# Patient Record
Sex: FEMALE | Race: WHITE | NOT HISPANIC OR LATINO | Employment: UNEMPLOYED | URBAN - METROPOLITAN AREA
[De-identification: names, ages, dates, MRNs, and addresses within clinical notes are randomized per-mention and may not be internally consistent; named-entity substitution may affect disease eponyms.]

---

## 2023-09-08 ENCOUNTER — LAB (OUTPATIENT)
Dept: LAB | Facility: LAB | Age: 73
End: 2023-09-08
Payer: MEDICARE

## 2023-09-08 DIAGNOSIS — Z00.00 HEALTHCARE MAINTENANCE: ICD-10-CM

## 2023-09-08 LAB
ALANINE AMINOTRANSFERASE (SGPT) (U/L) IN SER/PLAS: 17 U/L (ref 7–45)
ALBUMIN (G/DL) IN SER/PLAS: 4.1 G/DL (ref 3.4–5)
ALKALINE PHOSPHATASE (U/L) IN SER/PLAS: 51 U/L (ref 33–136)
ANION GAP IN SER/PLAS: 15 MMOL/L (ref 10–20)
APPEARANCE, URINE: CLEAR
ASPARTATE AMINOTRANSFERASE (SGOT) (U/L) IN SER/PLAS: 16 U/L (ref 9–39)
BASOPHILS (10*3/UL) IN BLOOD BY AUTOMATED COUNT: 0.01 X10E9/L (ref 0–0.1)
BASOPHILS/100 LEUKOCYTES IN BLOOD BY AUTOMATED COUNT: 0.3 % (ref 0–2)
BILIRUBIN TOTAL (MG/DL) IN SER/PLAS: 0.7 MG/DL (ref 0–1.2)
BILIRUBIN, URINE: NEGATIVE
BLOOD, URINE: NEGATIVE
CALCIDIOL (25 OH VITAMIN D3) (NG/ML) IN SER/PLAS: 35 NG/ML
CALCIUM (MG/DL) IN SER/PLAS: 9.3 MG/DL (ref 8.6–10.6)
CARBON DIOXIDE, TOTAL (MMOL/L) IN SER/PLAS: 27 MMOL/L (ref 21–32)
CHLORIDE (MMOL/L) IN SER/PLAS: 104 MMOL/L (ref 98–107)
CHOLESTEROL (MG/DL) IN SER/PLAS: 198 MG/DL (ref 0–199)
CHOLESTEROL IN HDL (MG/DL) IN SER/PLAS: 56.2 MG/DL
CHOLESTEROL/HDL RATIO: 3.5
COLOR, URINE: YELLOW
CREATININE (MG/DL) IN SER/PLAS: 0.77 MG/DL (ref 0.5–1.05)
EOSINOPHILS (10*3/UL) IN BLOOD BY AUTOMATED COUNT: 0.05 X10E9/L (ref 0–0.4)
EOSINOPHILS/100 LEUKOCYTES IN BLOOD BY AUTOMATED COUNT: 1.3 % (ref 0–6)
ERYTHROCYTE DISTRIBUTION WIDTH (RATIO) BY AUTOMATED COUNT: 11.8 % (ref 11.5–14.5)
ERYTHROCYTE MEAN CORPUSCULAR HEMOGLOBIN CONCENTRATION (G/DL) BY AUTOMATED: 32.7 G/DL (ref 32–36)
ERYTHROCYTE MEAN CORPUSCULAR VOLUME (FL) BY AUTOMATED COUNT: 98 FL (ref 80–100)
ERYTHROCYTES (10*6/UL) IN BLOOD BY AUTOMATED COUNT: 4.36 X10E12/L (ref 4–5.2)
GFR FEMALE: 81 ML/MIN/1.73M2
GLUCOSE (MG/DL) IN SER/PLAS: 90 MG/DL (ref 74–99)
GLUCOSE, URINE: NEGATIVE MG/DL
HEMATOCRIT (%) IN BLOOD BY AUTOMATED COUNT: 42.8 % (ref 36–46)
HEMOGLOBIN (G/DL) IN BLOOD: 14 G/DL (ref 12–16)
IMMATURE GRANULOCYTES/100 LEUKOCYTES IN BLOOD BY AUTOMATED COUNT: 0.3 % (ref 0–0.9)
KETONES, URINE: NEGATIVE MG/DL
LDL: 115 MG/DL (ref 0–99)
LEUKOCYTE ESTERASE, URINE: ABNORMAL
LEUKOCYTES (10*3/UL) IN BLOOD BY AUTOMATED COUNT: 3.8 X10E9/L (ref 4.4–11.3)
LYMPHOCYTES (10*3/UL) IN BLOOD BY AUTOMATED COUNT: 1.14 X10E9/L (ref 0.8–3)
LYMPHOCYTES/100 LEUKOCYTES IN BLOOD BY AUTOMATED COUNT: 29.8 % (ref 13–44)
MONOCYTES (10*3/UL) IN BLOOD BY AUTOMATED COUNT: 0.31 X10E9/L (ref 0.05–0.8)
MONOCYTES/100 LEUKOCYTES IN BLOOD BY AUTOMATED COUNT: 8.1 % (ref 2–10)
NEUTROPHILS (10*3/UL) IN BLOOD BY AUTOMATED COUNT: 2.31 X10E9/L (ref 1.6–5.5)
NEUTROPHILS/100 LEUKOCYTES IN BLOOD BY AUTOMATED COUNT: 60.2 % (ref 40–80)
NITRITE, URINE: NEGATIVE
NRBC (PER 100 WBCS) BY AUTOMATED COUNT: 0 /100 WBC (ref 0–0)
PH, URINE: 6 (ref 5–8)
PLATELETS (10*3/UL) IN BLOOD AUTOMATED COUNT: 189 X10E9/L (ref 150–450)
POTASSIUM (MMOL/L) IN SER/PLAS: 4.6 MMOL/L (ref 3.5–5.3)
PROTEIN TOTAL: 6.3 G/DL (ref 6.4–8.2)
PROTEIN, URINE: NEGATIVE MG/DL
RBC, URINE: 1 /HPF (ref 0–5)
SODIUM (MMOL/L) IN SER/PLAS: 141 MMOL/L (ref 136–145)
SPECIFIC GRAVITY, URINE: 1.01 (ref 1–1.03)
SQUAMOUS EPITHELIAL CELLS, URINE: <1 /HPF
THYROTROPIN (MIU/L) IN SER/PLAS BY DETECTION LIMIT <= 0.05 MIU/L: 1.78 MIU/L (ref 0.44–3.98)
TRIGLYCERIDE (MG/DL) IN SER/PLAS: 136 MG/DL (ref 0–149)
UREA NITROGEN (MG/DL) IN SER/PLAS: 16 MG/DL (ref 6–23)
UROBILINOGEN, URINE: <2 MG/DL (ref 0–1.9)
VLDL: 27 MG/DL (ref 0–40)
WBC, URINE: 3 /HPF (ref 0–5)

## 2023-09-08 PROCEDURE — 85025 COMPLETE CBC W/AUTO DIFF WBC: CPT

## 2023-09-08 PROCEDURE — 81001 URINALYSIS AUTO W/SCOPE: CPT

## 2023-09-08 PROCEDURE — 84443 ASSAY THYROID STIM HORMONE: CPT

## 2023-09-08 PROCEDURE — 36415 COLL VENOUS BLD VENIPUNCTURE: CPT

## 2023-09-08 PROCEDURE — 80053 COMPREHEN METABOLIC PANEL: CPT

## 2023-09-08 PROCEDURE — 80061 LIPID PANEL: CPT

## 2023-09-08 PROCEDURE — 82306 VITAMIN D 25 HYDROXY: CPT

## 2023-09-12 ENCOUNTER — OFFICE VISIT (OUTPATIENT)
Dept: PRIMARY CARE | Facility: CLINIC | Age: 73
End: 2023-09-12
Payer: MEDICARE

## 2023-09-12 VITALS
WEIGHT: 159.8 LBS | HEIGHT: 65 IN | OXYGEN SATURATION: 99 % | RESPIRATION RATE: 18 BRPM | BODY MASS INDEX: 26.62 KG/M2 | HEART RATE: 68 BPM

## 2023-09-12 DIAGNOSIS — F41.9 ANXIETY: ICD-10-CM

## 2023-09-12 DIAGNOSIS — Z00.00 HEALTHCARE MAINTENANCE: Primary | ICD-10-CM

## 2023-09-12 DIAGNOSIS — M81.0 OSTEOPOROSIS, UNSPECIFIED OSTEOPOROSIS TYPE, UNSPECIFIED PATHOLOGICAL FRACTURE PRESENCE: ICD-10-CM

## 2023-09-12 PROBLEM — H93.13 BILATERAL TINNITUS: Status: ACTIVE | Noted: 2023-09-12

## 2023-09-12 PROBLEM — H00.019 STYE: Status: ACTIVE | Noted: 2023-09-12

## 2023-09-12 PROBLEM — H93.19 TINNITUS: Status: ACTIVE | Noted: 2023-09-12

## 2023-09-12 PROBLEM — N89.8 VAGINAL ITCHING: Status: ACTIVE | Noted: 2023-09-12

## 2023-09-12 PROBLEM — E78.1 HIGH TRIGLYCERIDES: Status: ACTIVE | Noted: 2023-09-12

## 2023-09-12 PROBLEM — R09.A0 SENSATION OF FOREIGN BODY: Status: ACTIVE | Noted: 2023-09-12

## 2023-09-12 PROBLEM — N76.0 VAGINITIS: Status: ACTIVE | Noted: 2023-09-12

## 2023-09-12 PROBLEM — R05.3 CHRONIC COUGH: Status: ACTIVE | Noted: 2023-09-12

## 2023-09-12 PROBLEM — J34.3 NASAL TURBINATE HYPERTROPHY: Status: ACTIVE | Noted: 2023-09-12

## 2023-09-12 PROBLEM — L90.0 LICHEN SCLEROSUS: Status: ACTIVE | Noted: 2023-09-12

## 2023-09-12 PROBLEM — M85.80 OSTEOPENIA: Status: ACTIVE | Noted: 2023-09-12

## 2023-09-12 PROBLEM — M67.431 GANGLION CYST OF WRIST, RIGHT: Status: ACTIVE | Noted: 2023-09-12

## 2023-09-12 PROBLEM — R92.8 ABNORMAL MAMMOGRAM: Status: ACTIVE | Noted: 2023-09-12

## 2023-09-12 PROBLEM — N95.2 ATROPHY OF VAGINA: Status: ACTIVE | Noted: 2023-09-12

## 2023-09-12 PROBLEM — J32.9 SINUSITIS: Status: ACTIVE | Noted: 2023-09-12

## 2023-09-12 PROBLEM — L03.019 PARONYCHIA, FINGER: Status: ACTIVE | Noted: 2023-09-12

## 2023-09-12 PROBLEM — R35.0 URINARY FREQUENCY: Status: ACTIVE | Noted: 2023-09-12

## 2023-09-12 PROBLEM — S61.219A FINGER LACERATION: Status: ACTIVE | Noted: 2023-09-12

## 2023-09-12 PROCEDURE — 1170F FXNL STATUS ASSESSED: CPT | Performed by: INTERNAL MEDICINE

## 2023-09-12 PROCEDURE — 1160F RVW MEDS BY RX/DR IN RCRD: CPT | Performed by: INTERNAL MEDICINE

## 2023-09-12 PROCEDURE — G0439 PPPS, SUBSEQ VISIT: HCPCS | Performed by: INTERNAL MEDICINE

## 2023-09-12 PROCEDURE — 1159F MED LIST DOCD IN RCRD: CPT | Performed by: INTERNAL MEDICINE

## 2023-09-12 PROCEDURE — 1036F TOBACCO NON-USER: CPT | Performed by: INTERNAL MEDICINE

## 2023-09-12 PROCEDURE — 1126F AMNT PAIN NOTED NONE PRSNT: CPT | Performed by: INTERNAL MEDICINE

## 2023-09-12 RX ORDER — VALACYCLOVIR HYDROCHLORIDE 500 MG/1
TABLET, FILM COATED ORAL
COMMUNITY
Start: 2023-01-04

## 2023-09-12 RX ORDER — ESCITALOPRAM OXALATE 10 MG/1
TABLET ORAL
COMMUNITY
Start: 2011-02-10 | End: 2023-09-12 | Stop reason: ALTCHOICE

## 2023-09-12 RX ORDER — CITALOPRAM 20 MG/1
20 TABLET, FILM COATED ORAL DAILY
COMMUNITY
End: 2023-09-12 | Stop reason: SDUPTHER

## 2023-09-12 RX ORDER — ALENDRONATE SODIUM 70 MG/1
70 TABLET ORAL
COMMUNITY
Start: 2023-08-01 | End: 2024-04-09

## 2023-09-12 RX ORDER — ACETAMINOPHEN 500 MG
TABLET ORAL
COMMUNITY

## 2023-09-12 RX ORDER — MULTIVIT-MIN/FA/LYCOPEN/LUTEIN .4-300-25
TABLET ORAL
COMMUNITY

## 2023-09-12 RX ORDER — CITALOPRAM 20 MG/1
20 TABLET, FILM COATED ORAL DAILY
Qty: 90 TABLET | Refills: 3 | Status: SHIPPED | OUTPATIENT
Start: 2023-09-12 | End: 2024-09-11

## 2023-09-12 ASSESSMENT — ACTIVITIES OF DAILY LIVING (ADL)
TAKING_MEDICATION: INDEPENDENT
DRESSING: INDEPENDENT
BATHING: INDEPENDENT
MANAGING_FINANCES: INDEPENDENT
GROCERY_SHOPPING: INDEPENDENT
DOING_HOUSEWORK: INDEPENDENT

## 2023-09-12 ASSESSMENT — PATIENT HEALTH QUESTIONNAIRE - PHQ9
1. LITTLE INTEREST OR PLEASURE IN DOING THINGS: NOT AT ALL
SUM OF ALL RESPONSES TO PHQ9 QUESTIONS 1 AND 2: 0
2. FEELING DOWN, DEPRESSED OR HOPELESS: NOT AT ALL
1. LITTLE INTEREST OR PLEASURE IN DOING THINGS: NOT AT ALL
SUM OF ALL RESPONSES TO PHQ9 QUESTIONS 1 AND 2: 0
2. FEELING DOWN, DEPRESSED OR HOPELESS: NOT AT ALL

## 2023-09-12 ASSESSMENT — ENCOUNTER SYMPTOMS
DEPRESSION: 0
OCCASIONAL FEELINGS OF UNSTEADINESS: 0
LOSS OF SENSATION IN FEET: 0

## 2023-09-12 NOTE — PROGRESS NOTES
"Subjective   Reason for Visit: Kelly Moreland is an 73 y.o. female here for a Medicare Wellness visit.     73 F    Osteoporosis    OA    ATS         Reviewed all medications by prescribing practitioner or clinical pharmacist (such as prescriptions, OTCs, herbal therapies and supplements) and documented in the medical record.    HPI    Patient Care Team:  Reece Esparza MD as PCP - General  Hoang Garza MD as PCP - [employer]age PCP     Review of Systems      No Fever/chills/headaches/dizziness/chest pains/ shortness of breath/palpitations/Nausea/vomiting/diarrhea/ constipation/urine frequency/blood in urine.      Objective   Vitals:  Pulse 68   Resp 18   Ht 1.644 m (5' 4.72\")   Wt 72.5 kg (159 lb 12.8 oz)   SpO2 99%   BMI 26.82 kg/m²       Physical Exam    No JVP elevation. No palpable Lymph Nodes. No Thyromegaly    CVS-NL S1/S2 . No MRG    Lungs-CTA. B/S= B/L    Abdomen-Soft, Non-tender. No masses or HSM    Extremities: No C/C/E      Assessment/Plan   Problem List Items Addressed This Visit    None  Visit Diagnoses       Healthcare maintenance        Relevant Orders    TSH with reflex to Free T4 if abnormal (Completed)    Lipid Panel (Completed)    Comprehensive Metabolic Panel (Completed)    CBC and Auto Differential (Completed)    Urinalysis with Reflex Microscopic (Completed)    Vitamin D 25-Hydroxy,Total (for eval of Vitamin D levels) (Completed)        73 F    Osteoporosis-Continue with Fosamax 70 mg Q week. Follow up with Rheumatology    OA    ATS-Continue with Citalopram 20 mg daily    CRC-    Mammogram/Gyn-Constantin Kim    Continue with current Rx    Follow up/ Call with any concerns    Follow up in 12 months /PRN             "

## 2023-11-15 ENCOUNTER — OFFICE VISIT (OUTPATIENT)
Dept: OBSTETRICS AND GYNECOLOGY | Facility: CLINIC | Age: 73
End: 2023-11-15
Payer: MEDICARE

## 2023-11-15 VITALS
WEIGHT: 155 LBS | HEIGHT: 65 IN | DIASTOLIC BLOOD PRESSURE: 76 MMHG | SYSTOLIC BLOOD PRESSURE: 124 MMHG | BODY MASS INDEX: 25.83 KG/M2

## 2023-11-15 DIAGNOSIS — Z01.419 WELL WOMAN EXAM WITH ROUTINE GYNECOLOGICAL EXAM: Primary | ICD-10-CM

## 2023-11-15 DIAGNOSIS — L90.0 LICHEN SCLEROSUS: ICD-10-CM

## 2023-11-15 DIAGNOSIS — Z12.31 BREAST CANCER SCREENING BY MAMMOGRAM: ICD-10-CM

## 2023-11-15 PROCEDURE — 1160F RVW MEDS BY RX/DR IN RCRD: CPT | Performed by: NURSE PRACTITIONER

## 2023-11-15 PROCEDURE — 88305 TISSUE EXAM BY PATHOLOGIST: CPT | Performed by: DERMATOLOGY

## 2023-11-15 PROCEDURE — 1036F TOBACCO NON-USER: CPT | Performed by: NURSE PRACTITIONER

## 2023-11-15 PROCEDURE — 1159F MED LIST DOCD IN RCRD: CPT | Performed by: NURSE PRACTITIONER

## 2023-11-15 PROCEDURE — 88305 TISSUE EXAM BY PATHOLOGIST: CPT

## 2023-11-15 PROCEDURE — 99397 PER PM REEVAL EST PAT 65+ YR: CPT | Performed by: NURSE PRACTITIONER

## 2023-11-15 PROCEDURE — 1126F AMNT PAIN NOTED NONE PRSNT: CPT | Performed by: NURSE PRACTITIONER

## 2023-11-15 RX ORDER — CLOBETASOL PROPIONATE 0.5 MG/G
OINTMENT TOPICAL 2 TIMES DAILY
Qty: 45 G | Refills: 0 | Status: SHIPPED | OUTPATIENT
Start: 2023-11-15

## 2023-11-15 ASSESSMENT — PAIN SCALES - GENERAL: PAINLEVEL: 0-NO PAIN

## 2023-11-15 ASSESSMENT — ENCOUNTER SYMPTOMS
ENDOCRINE NEGATIVE: 0
EYES NEGATIVE: 0
MUSCULOSKELETAL NEGATIVE: 0
ALLERGIC/IMMUNOLOGIC NEGATIVE: 0
GASTROINTESTINAL NEGATIVE: 0
CONSTITUTIONAL NEGATIVE: 0
PSYCHIATRIC NEGATIVE: 0
CARDIOVASCULAR NEGATIVE: 0
HEMATOLOGIC/LYMPHATIC NEGATIVE: 0
NEUROLOGICAL NEGATIVE: 0
RESPIRATORY NEGATIVE: 0

## 2023-11-15 NOTE — PROGRESS NOTES
Subjective   Patient ID: Kelly Moreland is a 73 y.o. female who presents for Annual Exam (Last pap 12/22/2014 /Last emiliana 12/1/2022/Last col unknown/Last dex 9/26/2022).  HPI  had a consult with rheumatology, diagnosed with osteoporosis; was on Raloxifene but was now changed to fosamax  i previously suspected LS based off of exam; pt had stopped using clobetasol and only has occasional dryness, no biopsy    Occasional pruritus near rectum, has not treated     Age at Menopause: 49  History of HT: for a few years initially   History of bioidentical/otc: none  Postmenopausal bleeding: n/a  Mood changes: occasional seasonal affective depression, on citalopram  Sleep problems: none  VMS: none  GSM: none currently  Urinary incontinence: nocturia followed by leaking, no incontinence during the day  Vaginal hygiene: soap-dove  BMD: osteoporosis, managed by rheumatology, fosamax       H/O of STI: none  requesting sti testing?: no  H/O abnormal pap: none     are you sexually active?: no by choice        Lives with: by herself  Employment: retired teacher  exercise: walks  calcium intake: adequate     FH of breast cancer: maternal grandmother  FH of ovarian cancer: none  FH of colon cancer: none        Review of Systems    Objective   Physical Exam  Vitals and nursing note reviewed.   Constitutional:       Appearance: Normal appearance.   Neck:      Thyroid: No thyroid mass.   Cardiovascular:      Rate and Rhythm: Normal rate and regular rhythm.      Heart sounds: Normal heart sounds.   Pulmonary:      Effort: Pulmonary effort is normal.      Breath sounds: Normal breath sounds.   Chest:   Breasts:     Right: Normal.      Left: Normal.   Abdominal:      Tenderness: There is no abdominal tenderness.   Genitourinary:     General: Normal vulva.      Exam position: Lithotomy position.      Labia:         Right: No lesion.         Left: No lesion.       Vagina: Normal.      Cervix: Normal.          Comments: Whitening in the above  marked area, consistent with LS, no lesions  Skin:     General: Skin is warm and dry.   Neurological:      Mental Status: She is alert.   Psychiatric:         Attention and Perception: Attention normal.         Mood and Affect: Mood normal.         Speech: Speech normal.         Behavior: Behavior normal.         Thought Content: Thought content normal.         Cognition and Memory: Cognition and memory normal.         Judgment: Judgment normal.     Patient ID: Kelly Moreland is a 73 y.o. female.    Biopsy vulva    Date/Time: 11/15/2023 9:39 AM    Performed by: RAHUL Bruce  Authorized by: RAHUL Bruce    Consent:     Consent obtained:  Written    Consent given by:  Patient    Risks, benefits, and alternatives were discussed: yes      Risks discussed:  Bleeding, infection and pain    Alternatives discussed:  No treatment and observation  Universal protocol:     Procedure explained and questions answered to patient or proxy's satisfaction: yes      Site/side marked: yes      Immediately prior to procedure, a time out was called: yes      Patient identity confirmed:  Verbally with patient      Assessment/Plan   Diagnoses and all orders for this visit:  Well woman exam with routine gynecological exam  Breast cancer screening by mammogram  -     BI mammo bilateral screening tomosynthesis; Future  Lichen sclerosus  -     Biopsy vulva  -     Dermatopathology-[facility]    Clobetasol prescribed; will call with result

## 2023-11-16 ENCOUNTER — APPOINTMENT (OUTPATIENT)
Dept: RADIOLOGY | Facility: CLINIC | Age: 73
End: 2023-11-16
Payer: MEDICARE

## 2023-11-17 LAB
LABORATORY COMMENT REPORT: NORMAL
PATH REPORT.FINAL DX SPEC: NORMAL
PATH REPORT.GROSS SPEC: NORMAL
PATH REPORT.RELEVANT HX SPEC: NORMAL
PATH REPORT.TOTAL CANCER: NORMAL

## 2023-11-28 ENCOUNTER — APPOINTMENT (OUTPATIENT)
Dept: OBSTETRICS AND GYNECOLOGY | Facility: CLINIC | Age: 73
End: 2023-11-28
Payer: MEDICARE

## 2023-12-04 ENCOUNTER — ANCILLARY PROCEDURE (OUTPATIENT)
Dept: RADIOLOGY | Facility: CLINIC | Age: 73
End: 2023-12-04
Payer: MEDICARE

## 2023-12-04 VITALS — WEIGHT: 154.98 LBS | HEIGHT: 65 IN | BODY MASS INDEX: 25.82 KG/M2

## 2023-12-04 DIAGNOSIS — Z12.31 BREAST CANCER SCREENING BY MAMMOGRAM: ICD-10-CM

## 2023-12-04 PROCEDURE — 77067 SCR MAMMO BI INCL CAD: CPT | Performed by: RADIOLOGY

## 2023-12-04 PROCEDURE — 77063 BREAST TOMOSYNTHESIS BI: CPT | Performed by: RADIOLOGY

## 2023-12-04 PROCEDURE — 77067 SCR MAMMO BI INCL CAD: CPT

## 2023-12-19 ENCOUNTER — OFFICE VISIT (OUTPATIENT)
Dept: OBSTETRICS AND GYNECOLOGY | Facility: CLINIC | Age: 73
End: 2023-12-19
Payer: MEDICARE

## 2023-12-19 DIAGNOSIS — L28.0 LICHEN SIMPLEX CHRONICUS: Primary | ICD-10-CM

## 2023-12-19 PROCEDURE — 1159F MED LIST DOCD IN RCRD: CPT | Performed by: NURSE PRACTITIONER

## 2023-12-19 PROCEDURE — 1036F TOBACCO NON-USER: CPT | Performed by: NURSE PRACTITIONER

## 2023-12-19 PROCEDURE — 99213 OFFICE O/P EST LOW 20 MIN: CPT | Performed by: NURSE PRACTITIONER

## 2023-12-19 PROCEDURE — 1160F RVW MEDS BY RX/DR IN RCRD: CPT | Performed by: NURSE PRACTITIONER

## 2023-12-19 PROCEDURE — 1126F AMNT PAIN NOTED NONE PRSNT: CPT | Performed by: NURSE PRACTITIONER

## 2023-12-19 ASSESSMENT — ENCOUNTER SYMPTOMS
CARDIOVASCULAR NEGATIVE: 0
MUSCULOSKELETAL NEGATIVE: 0
PSYCHIATRIC NEGATIVE: 0
CONSTITUTIONAL NEGATIVE: 0
EYES NEGATIVE: 0
NEUROLOGICAL NEGATIVE: 0
RESPIRATORY NEGATIVE: 0
ENDOCRINE NEGATIVE: 0
HEMATOLOGIC/LYMPHATIC NEGATIVE: 0
GASTROINTESTINAL NEGATIVE: 0
ALLERGIC/IMMUNOLOGIC NEGATIVE: 0

## 2023-12-19 NOTE — PROGRESS NOTES
Subjective   Patient ID: Kelly Moreland is a 73 y.o. female who presents for No chief complaint on file..  HPI  Vulvar biopsy 11/2023; LSC, not LS  Clobetasol BID prescribed    Today she states she is feeling much better; no longer having pruritus      Review of Systems    Objective   Physical Exam  Genitourinary:         Comments: No longer having any whitening; erythema in the above marked areas, no lesions        Assessment/Plan   Diagnoses and all orders for this visit:  Lichen simplex chronicus       Pt asked to cut down on the clobetasol from BID to daily  Pt asked to contact me in 2-3 weeks with an update; will continue to wean down    RAHUL Bruce 12/19/23 4:04 PM

## 2024-04-09 DIAGNOSIS — M81.0 AGE-RELATED OSTEOPOROSIS WITHOUT CURRENT PATHOLOGICAL FRACTURE: ICD-10-CM

## 2024-04-09 RX ORDER — ALENDRONATE SODIUM 70 MG/1
70 TABLET ORAL
Qty: 12 TABLET | Refills: 0 | Status: SHIPPED | OUTPATIENT
Start: 2024-04-09

## 2024-07-02 DIAGNOSIS — M81.0 AGE-RELATED OSTEOPOROSIS WITHOUT CURRENT PATHOLOGICAL FRACTURE: ICD-10-CM

## 2024-07-02 RX ORDER — ALENDRONATE SODIUM 70 MG/1
70 TABLET ORAL
Qty: 4 TABLET | Refills: 0 | Status: SHIPPED | OUTPATIENT
Start: 2024-07-02

## 2024-08-01 DIAGNOSIS — M81.0 AGE-RELATED OSTEOPOROSIS WITHOUT CURRENT PATHOLOGICAL FRACTURE: ICD-10-CM

## 2024-08-01 RX ORDER — ALENDRONATE SODIUM 70 MG/1
70 TABLET ORAL
Qty: 4 TABLET | Refills: 0 | Status: SHIPPED | OUTPATIENT
Start: 2024-08-04

## 2024-08-12 ENCOUNTER — TELEPHONE (OUTPATIENT)
Dept: PRIMARY CARE | Facility: CLINIC | Age: 74
End: 2024-08-12
Payer: MEDICARE

## 2024-08-12 NOTE — TELEPHONE ENCOUNTER
Patient stated she was seen by you in October 2023 but, she does not remember when she should follow up.  When do you want to see her again?

## 2024-08-28 DIAGNOSIS — M81.0 AGE-RELATED OSTEOPOROSIS WITHOUT CURRENT PATHOLOGICAL FRACTURE: ICD-10-CM

## 2024-08-28 RX ORDER — ALENDRONATE SODIUM 70 MG/1
70 TABLET ORAL
Qty: 12 TABLET | Refills: 0 | Status: SHIPPED | OUTPATIENT
Start: 2024-09-01

## 2024-09-18 ENCOUNTER — APPOINTMENT (OUTPATIENT)
Dept: PRIMARY CARE | Facility: CLINIC | Age: 74
End: 2024-09-18
Payer: MEDICARE

## 2024-09-25 ENCOUNTER — LAB (OUTPATIENT)
Dept: LAB | Facility: LAB | Age: 74
End: 2024-09-25
Payer: MEDICARE

## 2024-09-25 ENCOUNTER — APPOINTMENT (OUTPATIENT)
Dept: PRIMARY CARE | Facility: CLINIC | Age: 74
End: 2024-09-25
Payer: MEDICARE

## 2024-09-25 VITALS
HEART RATE: 77 BPM | HEIGHT: 65 IN | DIASTOLIC BLOOD PRESSURE: 79 MMHG | BODY MASS INDEX: 28.32 KG/M2 | SYSTOLIC BLOOD PRESSURE: 121 MMHG | WEIGHT: 170 LBS

## 2024-09-25 DIAGNOSIS — M81.0 POST-MENOPAUSAL OSTEOPOROSIS: ICD-10-CM

## 2024-09-25 DIAGNOSIS — E78.1 HIGH TRIGLYCERIDES: ICD-10-CM

## 2024-09-25 DIAGNOSIS — Z00.00 MEDICARE ANNUAL WELLNESS VISIT, SUBSEQUENT: ICD-10-CM

## 2024-09-25 DIAGNOSIS — F41.9 ANXIETY: ICD-10-CM

## 2024-09-25 DIAGNOSIS — R79.9 ABNORMAL FINDING OF BLOOD CHEMISTRY, UNSPECIFIED: ICD-10-CM

## 2024-09-25 DIAGNOSIS — Z23 NEED FOR PNEUMOCOCCAL 20-VALENT CONJUGATE VACCINATION: ICD-10-CM

## 2024-09-25 DIAGNOSIS — Z12.31 SCREENING MAMMOGRAM FOR BREAST CANCER: ICD-10-CM

## 2024-09-25 DIAGNOSIS — Z00.00 MEDICARE ANNUAL WELLNESS VISIT, SUBSEQUENT: Primary | ICD-10-CM

## 2024-09-25 PROBLEM — Z86.14 HISTORY OF METHICILLIN RESISTANT STAPHYLOCOCCUS AUREUS INFECTION: Status: RESOLVED | Noted: 2024-09-25 | Resolved: 2024-09-25

## 2024-09-25 LAB
25(OH)D3 SERPL-MCNC: 40 NG/ML (ref 30–100)
ALBUMIN SERPL BCP-MCNC: 4.5 G/DL (ref 3.4–5)
ALP SERPL-CCNC: 47 U/L (ref 33–136)
ALT SERPL W P-5'-P-CCNC: 15 U/L (ref 7–45)
ANION GAP SERPL CALC-SCNC: 15 MMOL/L (ref 10–20)
APPEARANCE UR: ABNORMAL
AST SERPL W P-5'-P-CCNC: 17 U/L (ref 9–39)
BILIRUB SERPL-MCNC: 0.7 MG/DL (ref 0–1.2)
BILIRUB UR STRIP.AUTO-MCNC: NEGATIVE MG/DL
BUN SERPL-MCNC: 19 MG/DL (ref 6–23)
CALCIUM SERPL-MCNC: 9.3 MG/DL (ref 8.6–10.6)
CHLORIDE SERPL-SCNC: 105 MMOL/L (ref 98–107)
CHOLEST SERPL-MCNC: 197 MG/DL (ref 0–199)
CHOLESTEROL/HDL RATIO: 3.9
CO2 SERPL-SCNC: 25 MMOL/L (ref 21–32)
COLOR UR: ABNORMAL
CREAT SERPL-MCNC: 0.67 MG/DL (ref 0.5–1.05)
EGFRCR SERPLBLD CKD-EPI 2021: >90 ML/MIN/1.73M*2
ERYTHROCYTE [DISTWIDTH] IN BLOOD BY AUTOMATED COUNT: 12 % (ref 11.5–14.5)
GLUCOSE SERPL-MCNC: 76 MG/DL (ref 74–99)
GLUCOSE UR STRIP.AUTO-MCNC: NORMAL MG/DL
HCT VFR BLD AUTO: 41.7 % (ref 36–46)
HCV AB SER QL: NONREACTIVE
HDLC SERPL-MCNC: 50 MG/DL
HGB BLD-MCNC: 14.3 G/DL (ref 12–16)
KETONES UR STRIP.AUTO-MCNC: NEGATIVE MG/DL
LDLC SERPL CALC-MCNC: 105 MG/DL
LEUKOCYTE ESTERASE UR QL STRIP.AUTO: ABNORMAL
MCH RBC QN AUTO: 32.4 PG (ref 26–34)
MCHC RBC AUTO-ENTMCNC: 34.3 G/DL (ref 32–36)
MCV RBC AUTO: 94 FL (ref 80–100)
NITRITE UR QL STRIP.AUTO: NEGATIVE
NON HDL CHOLESTEROL: 147 MG/DL (ref 0–149)
NRBC BLD-RTO: 0 /100 WBCS (ref 0–0)
PH UR STRIP.AUTO: 5 [PH]
PLATELET # BLD AUTO: 204 X10*3/UL (ref 150–450)
POTASSIUM SERPL-SCNC: 4.2 MMOL/L (ref 3.5–5.3)
PROT SERPL-MCNC: 6.7 G/DL (ref 6.4–8.2)
PROT UR STRIP.AUTO-MCNC: NEGATIVE MG/DL
RBC # BLD AUTO: 4.42 X10*6/UL (ref 4–5.2)
RBC # UR STRIP.AUTO: NEGATIVE /UL
RBC #/AREA URNS AUTO: ABNORMAL /HPF
SODIUM SERPL-SCNC: 141 MMOL/L (ref 136–145)
SP GR UR STRIP.AUTO: 1.02
SQUAMOUS #/AREA URNS AUTO: ABNORMAL /HPF
TRIGL SERPL-MCNC: 209 MG/DL (ref 0–149)
TSH SERPL-ACNC: 1.71 MIU/L (ref 0.44–3.98)
UROBILINOGEN UR STRIP.AUTO-MCNC: NORMAL MG/DL
VLDL: 42 MG/DL (ref 0–40)
WBC # BLD AUTO: 4.4 X10*3/UL (ref 4.4–11.3)
WBC #/AREA URNS AUTO: >50 /HPF
WBC CLUMPS #/AREA URNS AUTO: ABNORMAL /HPF

## 2024-09-25 PROCEDURE — 1170F FXNL STATUS ASSESSED: CPT | Performed by: INTERNAL MEDICINE

## 2024-09-25 PROCEDURE — G0439 PPPS, SUBSEQ VISIT: HCPCS | Performed by: INTERNAL MEDICINE

## 2024-09-25 PROCEDURE — 99397 PER PM REEVAL EST PAT 65+ YR: CPT | Performed by: INTERNAL MEDICINE

## 2024-09-25 PROCEDURE — G0009 ADMIN PNEUMOCOCCAL VACCINE: HCPCS | Performed by: INTERNAL MEDICINE

## 2024-09-25 PROCEDURE — 1124F ACP DISCUSS-NO DSCNMKR DOCD: CPT | Performed by: INTERNAL MEDICINE

## 2024-09-25 PROCEDURE — 1036F TOBACCO NON-USER: CPT | Performed by: INTERNAL MEDICINE

## 2024-09-25 PROCEDURE — 99214 OFFICE O/P EST MOD 30 MIN: CPT | Performed by: INTERNAL MEDICINE

## 2024-09-25 PROCEDURE — 1160F RVW MEDS BY RX/DR IN RCRD: CPT | Performed by: INTERNAL MEDICINE

## 2024-09-25 PROCEDURE — 90677 PCV20 VACCINE IM: CPT | Performed by: INTERNAL MEDICINE

## 2024-09-25 PROCEDURE — 1159F MED LIST DOCD IN RCRD: CPT | Performed by: INTERNAL MEDICINE

## 2024-09-25 PROCEDURE — 36415 COLL VENOUS BLD VENIPUNCTURE: CPT

## 2024-09-25 RX ORDER — DORZOLAMIDE HYDROCHLORIDE AND TIMOLOL MALEATE 20; 5 MG/ML; MG/ML
1 SOLUTION/ DROPS OPHTHALMIC 2 TIMES DAILY
COMMUNITY
Start: 2024-07-31

## 2024-09-25 RX ORDER — ERYTHROMYCIN 5 MG/G
1 OINTMENT OPHTHALMIC
COMMUNITY
Start: 2024-09-23 | End: 2024-09-30

## 2024-09-25 ASSESSMENT — ACTIVITIES OF DAILY LIVING (ADL)
DRESSING: INDEPENDENT
DOING_HOUSEWORK: INDEPENDENT
GROCERY_SHOPPING: INDEPENDENT
BATHING: INDEPENDENT
MANAGING_FINANCES: INDEPENDENT
TAKING_MEDICATION: INDEPENDENT

## 2024-09-25 NOTE — ASSESSMENT & PLAN NOTE
Patient is being managed by rheumatology and is on alendronate, she will get DEXA done through Dr. Garza  Orders:    CBC; Future    Vitamin D 25-Hydroxy,Total (for eval of Vitamin D levels); Future     (1) other risk factor (includes escalating BMI, pack-years of smoking, diabetes requiring insulin, chemotherapy, female gender and length of surgery)

## 2024-09-25 NOTE — PROGRESS NOTES
"Subjective   Reason for Visit: Kelly Moreland is an 74 y.o. female here for a Medicare Wellness visit.     Past Medical, Surgical, and Family History reviewed and updated in chart.         JOSE MANUEL Mendoza is a 74-year-old  female who comes to establish primary care as her previous provider is relocating his practice.  She is due for a Medicare annual wellness exam and lab work.  Patient does have history of age-related osteoporosis for which she is seeing rheumatology.  Patient has history of anxiety but this is stable on current dose of citalopram.  She is due for the Prevnar 20 vaccination which she is agreeable to getting done today and is up-to-date on the Shingrix vaccine.  Patient declines high-dose flu vaccine.  Per patient, she has had a normal colonoscopy within the past 5 years and no further follow-up was recommended due to age.    Patient has been following up at the [facility] for a left corneal abrasion but this seems to be improving with treatment.    No history of fever, chills, chest pain, shortness of breath, cough, dizziness, palpitations, syncope, abdominal pain, nausea, vomiting, diarrhea, melena, rectal bleeding, dysuria, hematuria, headaches, weakness, numbness, mood or sleep issues, loss of weight or loss of appetite reported.    Patient lives alone and is independent in ADLs and IADLs.  She does see gynecology annually and will schedule an appointment for November, her mammogram will be due in December, and order placed for bilateral screening mammogram.  Patient Care Team:  Nolvia Miller MD as PCP - General (Internal Medicine)  Reece Esparza MD as PCP - [employer]vantage PCP     Review of Systems    Objective   Vitals:  /79 (BP Location: Right arm, Patient Position: Sitting, BP Cuff Size: Large adult)   Pulse 77   Ht 1.651 m (5' 5\")   Wt 77.1 kg (170 lb)   BMI 28.29 kg/m²       Physical Exam  General - well developed, well appearing, " overweight, elderly  female in no acute respiratory distress  Eyes - normal conjunctiva with no pallor or icterus, normal extraocular movements, minimal redness noted on the medial aspect of left eye  ENT - normal external auditory canals and tympanic membranes, throat clear with no exudates  Neck - No JVD, thyromegaly or lymphadenopathy  Lungs - no respiratory distress and lungs clear to auscultation bilaterally with no rales or wheezes  Heart - normal S1, S2 with normal heart rate, rhythm and no murmurs   Breasts, pelvic and pap - per gyn  Abdomen -  soft, nontender with no masses or organomegaly  Extremities - no cyanosis or pedal edema  Neuro - grossly normal neuro exam with no focal neuro deficits  Psych - normal mental status, mood and affect   Skin - no rashes or ulcers  MSK - normal gait with grossly normal ROM of major joints  Assessment & Plan  Medicare annual wellness visit, subsequent  Routine labs will be ordered, patient will get DEXA per rheumatology, no further colonoscopies due to advanced age, Prevnar 20 vaccination will be administered today, bilateral screening mammogram order placed for December, patient will be seeing her gynecologist in November  Orders:    Comprehensive Metabolic Panel; Future    Hepatitis C Antibody; Future    TSH with reflex to Free T4 if abnormal; Future    Urinalysis with Reflex Microscopic; Future    Anxiety  Stable and patient will continue current dose of citalopram       Post-menopausal osteoporosis  Patient is being managed by rheumatology and is on alendronate, she will get DEXA done through Dr. Garza  Orders:    CBC; Future    Vitamin D 25-Hydroxy,Total (for eval of Vitamin D levels); Future    High triglycerides    Orders:    Lipid Panel; Future    Need for pneumococcal 20-valent conjugate vaccination    Orders:    Pneumococcal conjugate vaccine, 20-valent (PREVNAR 20)    Abnormal finding of blood chemistry, unspecified    Orders:    CBC;  Future    Screening mammogram for breast cancer    Orders:    BI mammo bilateral screening tomosynthesis; Future    Follow-up in 6 to 12 months.  36 minutes spent rooming the patient, reviewing records, eliciting history, examining patient, counseling, coordination of care and in documentation.  This note was partially generated using the Dragon voice recognition system. There may be some incorrect words, spelling and punctuation errors that were not corrected prior to committing the note to the patient's medical record.

## 2024-09-26 DIAGNOSIS — R82.81 PYURIA: Primary | ICD-10-CM

## 2024-10-02 ENCOUNTER — APPOINTMENT (OUTPATIENT)
Dept: RHEUMATOLOGY | Facility: CLINIC | Age: 74
End: 2024-10-02
Payer: MEDICARE

## 2024-10-02 VITALS
WEIGHT: 170 LBS | OXYGEN SATURATION: 97 % | BODY MASS INDEX: 28.32 KG/M2 | HEIGHT: 65 IN | DIASTOLIC BLOOD PRESSURE: 74 MMHG | SYSTOLIC BLOOD PRESSURE: 104 MMHG | HEART RATE: 82 BPM

## 2024-10-02 DIAGNOSIS — M81.0 POST-MENOPAUSAL OSTEOPOROSIS: Primary | ICD-10-CM

## 2024-10-02 PROCEDURE — 99213 OFFICE O/P EST LOW 20 MIN: CPT | Performed by: INTERNAL MEDICINE

## 2024-10-02 PROCEDURE — 1036F TOBACCO NON-USER: CPT | Performed by: INTERNAL MEDICINE

## 2024-10-02 PROCEDURE — 1159F MED LIST DOCD IN RCRD: CPT | Performed by: INTERNAL MEDICINE

## 2024-10-02 NOTE — PROGRESS NOTES
"Subjective . Kelly Moreland is a 74 y.o. female who presents for Follow-up (Follow up).    HPI. 74-year-old female with history of postmenopausal osteoporosis, anxiety and lichen sclerosis presented for follow-up.     She is on alendronate since October 2022.  She has missed few doses.  She is tolerating it well.  She has no fall or fractures.  She does not smoke.  She takes calcium and vitamin D regularly.    Previous antiresorptive therapy: Raloxifene.    DEXA scan obtained in September 2022 showed left femoral neck T score of -2.5 with a decline of 7.5%, left total hip T score of -2.0 with a decline of 1.7% and L1-L4 T score of -2.8 with a decline of 1.5%. FRAX score is 16% for major osteoporotic fracture and 4.8% for hip fracture.     Review of Systems   All other systems reviewed and are negative.    Objective     Blood pressure 104/74, pulse 82, height 1.651 m (5' 5\"), weight 77.1 kg (170 lb), SpO2 97%.    Physical Exam.  Gen. AAO x3, NAD.  HEENT: No pallor or icterus, PERRLA, EOMI. No cervical lymphadenopathy .  Skin: No rashes.  Heart: S1, S2/ RRR.   Lungs: CTA B.  Abdomen: Soft, NT/ND.  MSK: No swollen or tender joint.  Neuro: Sensation to touch intact.Strength 5/5 throughout.   Psych:Appropriate mood and behavior  EXT: No edema    Assessment/Plan . 74-year-old female with history of postmenopausal osteoporosis, anxiety and lichen sclerosis presented for follow-up.     #1: Postmenopausal osteoporosis.  -Obtain DEXA scan.  -Continue alendronate once a week for now.  -Continue calcium and vitamin D.  -Recent labs reviewed.  She has normal creatinine, vitamin D and calcium levels.    Follow-up after DEXA scan.     This note was partially generated using the Dragon Voice recognition system. There may be some incorrect wording, spelling and/or spelling errors or punctuation errors that were not corrected prior to committing the note to the medical record.      Problem List Items Addressed This Visit       " Post-menopausal osteoporosis - Primary    Relevant Orders    XR DEXA bone density            Active Ambulatory Problems     Diagnosis Date Noted    Abnormal mammogram 09/12/2023    Anxiety 09/12/2023    Atrophy of vagina 09/12/2023    Vaginal itching 09/12/2023    Vaginitis 09/12/2023    Chronic cough 09/12/2023    Finger laceration 09/12/2023    Ganglion cyst of wrist, right 09/12/2023    High triglycerides 09/12/2023    Lichen sclerosus 09/12/2023    Nasal mucosa dry 01/18/2016    Nasal turbinate hypertrophy 09/12/2023    Osteopenia 09/12/2023    Paronychia, finger 09/12/2023    Sensation of foreign body 09/12/2023    Perioral dermatitis 01/18/2016    Sinusitis 09/12/2023    Stye 09/12/2023    Bilateral tinnitus 09/12/2023    Tinnitus 09/12/2023    Urinary frequency 09/12/2023    Post-menopausal osteoporosis 09/25/2024     Resolved Ambulatory Problems     Diagnosis Date Noted    History of methicillin resistant Staphylococcus aureus infection 09/25/2024     Past Medical History:   Diagnosis Date    Osteoporosis     Personal history of Methicillin resistant Staphylococcus aureus infection        No family history on file.    Past Surgical History:   Procedure Laterality Date    OTHER SURGICAL HISTORY  08/11/2020    Lipoma excision    OTHER SURGICAL HISTORY  08/11/2020    Tonsillectomy       Social History     Tobacco Use   Smoking Status Never   Smokeless Tobacco Never       Allergies  Pollen extracts    Current Meds  Current Outpatient Medications   Medication Instructions    alendronate (FOSAMAX) 70 mg, oral, Once Weekly    cholecalciferol (Vitamin D3) 50 mcg (2,000 unit) capsule oral    citalopram (CELEXA) 20 mg, oral, Daily    dorzolamide-timoloL (Cosopt) 22.3-6.8 mg/mL ophthalmic solution 1 drop, ophthalmic (eye), 2 times daily    erythromycin (Romycin) 5 mg/gram (0.5 %) ophthalmic ointment 1 Application, ophthalmic (eye)    multivitamin with minerals iron-free (Centrum Silver) oral                  Hoang  MD Greg

## 2024-11-06 ENCOUNTER — HOSPITAL ENCOUNTER (OUTPATIENT)
Dept: RADIOLOGY | Facility: CLINIC | Age: 74
Discharge: HOME | End: 2024-11-06
Payer: MEDICARE

## 2024-11-06 DIAGNOSIS — M81.0 POST-MENOPAUSAL OSTEOPOROSIS: ICD-10-CM

## 2024-11-06 PROCEDURE — 77080 DXA BONE DENSITY AXIAL: CPT

## 2024-11-06 PROCEDURE — 77080 DXA BONE DENSITY AXIAL: CPT | Performed by: RADIOLOGY

## 2024-11-13 ENCOUNTER — TELEPHONE (OUTPATIENT)
Dept: PRIMARY CARE | Facility: CLINIC | Age: 74
End: 2024-11-13

## 2024-11-13 ENCOUNTER — APPOINTMENT (OUTPATIENT)
Dept: RHEUMATOLOGY | Facility: CLINIC | Age: 74
End: 2024-11-13
Payer: MEDICARE

## 2024-11-13 VITALS
SYSTOLIC BLOOD PRESSURE: 130 MMHG | WEIGHT: 170 LBS | DIASTOLIC BLOOD PRESSURE: 80 MMHG | BODY MASS INDEX: 28.32 KG/M2 | HEART RATE: 71 BPM | HEIGHT: 65 IN

## 2024-11-13 DIAGNOSIS — M81.0 POST-MENOPAUSAL OSTEOPOROSIS: Primary | ICD-10-CM

## 2024-11-13 DIAGNOSIS — F41.9 ANXIETY: ICD-10-CM

## 2024-11-13 PROCEDURE — 1036F TOBACCO NON-USER: CPT | Performed by: INTERNAL MEDICINE

## 2024-11-13 PROCEDURE — 1159F MED LIST DOCD IN RCRD: CPT | Performed by: INTERNAL MEDICINE

## 2024-11-13 PROCEDURE — 99212 OFFICE O/P EST SF 10 MIN: CPT | Performed by: INTERNAL MEDICINE

## 2024-11-13 RX ORDER — CITALOPRAM 20 MG/1
20 TABLET, FILM COATED ORAL DAILY
Qty: 90 TABLET | Refills: 3 | Status: SHIPPED | OUTPATIENT
Start: 2024-11-13 | End: 2025-11-13

## 2024-11-13 NOTE — TELEPHONE ENCOUNTER
Patient needs a refill     Citalopram 20mg tablet #90 take one tablet daily    [facility] [phone]

## 2024-11-13 NOTE — PATIENT INSTRUCTIONS
Discontinue alendronate.  We will begin Prolia injection once approved.  Continue calcium and vitamin D.

## 2024-11-13 NOTE — PROGRESS NOTES
"Subjective . Kelly Moreland is a 74 y.o. female who presents for Follow-up (Test results ).    HPI. 74-year-old female with history of postmenopausal osteoporosis, anxiety and lichen sclerosis presented for follow-up.     No recent fall or fractures.  Takes calcium and vitamin D regularly.  She is on alendronate since October 2022.    Previously she was treated with raloxifene.    Bone densitometry obtained on November 7, 2024 showed L1-L4 BMD of 0.837 g/cm² reflecting a T-score of -2.9, left total femur BMD of 0.740 g/cm² reflecting a T-score of -2.1 and left femoral neck BMD of 0.690 g/cm² reflecting a T-score of -2.5.    Review of Systems   All other systems reviewed and are negative.    Objective     Blood pressure 130/80, pulse 71, height 1.651 m (5' 5\"), weight 77.1 kg (170 lb).      Assessment/Plan . 74-year-old female with history of postmenopausal osteoporosis, anxiety and lichen sclerosis presented for follow-up.    #1: Postmenopausal osteoporosis.  Bone densitometry reviewed with the patient.  BMD has declined despite taking alendronate for 24 months.  Previously she was treated with raloxifene.  -Begin denosumab/Prolia injection every 6 months.  Side effect discussed in length.  -Continue calcium and vitamin D.       This note was partially generated using the Dragon Voice recognition system. There may be some incorrect wording, spelling and/or spelling errors or punctuation errors that were not corrected prior to committing the note to the medical record.        Problem List Items Addressed This Visit       Post-menopausal osteoporosis - Primary    Relevant Medications    denosumab (Prolia) 60 mg/mL syringe            Active Ambulatory Problems     Diagnosis Date Noted    Abnormal mammogram 09/12/2023    Anxiety 09/12/2023    Atrophy of vagina 09/12/2023    Vaginal itching 09/12/2023    Vaginitis 09/12/2023    Chronic cough 09/12/2023    Finger laceration 09/12/2023    Ganglion cyst of wrist, right " 09/12/2023    High triglycerides 09/12/2023    Lichen sclerosus 09/12/2023    Nasal mucosa dry 01/18/2016    Nasal turbinate hypertrophy 09/12/2023    Osteopenia 09/12/2023    Paronychia, finger 09/12/2023    Sensation of foreign body 09/12/2023    Perioral dermatitis 01/18/2016    Sinusitis 09/12/2023    Stye 09/12/2023    Bilateral tinnitus 09/12/2023    Tinnitus 09/12/2023    Urinary frequency 09/12/2023    Post-menopausal osteoporosis 09/25/2024     Resolved Ambulatory Problems     Diagnosis Date Noted    History of methicillin resistant Staphylococcus aureus infection 09/25/2024     Past Medical History:   Diagnosis Date    Osteoporosis     Personal history of Methicillin resistant Staphylococcus aureus infection        No family history on file.    Past Surgical History:   Procedure Laterality Date    OTHER SURGICAL HISTORY  08/11/2020    Lipoma excision    OTHER SURGICAL HISTORY  08/11/2020    Tonsillectomy       Social History     Tobacco Use   Smoking Status Never   Smokeless Tobacco Never       Allergies  Pollen extracts    Current Meds  Current Outpatient Medications   Medication Instructions    cholecalciferol (Vitamin D3) 50 mcg (2,000 unit) capsule Take by mouth.    citalopram (CELEXA) 20 mg, oral, Daily    denosumab (PROLIA) 60 mg, subcutaneous, Every 6 months    dorzolamide-timoloL (Cosopt) 22.3-6.8 mg/mL ophthalmic solution 1 drop, ophthalmic (eye), 2 times daily    multivitamin with minerals iron-free (Centrum Silver) Take by mouth.         Hoang Garza MD

## 2024-11-14 ENCOUNTER — SPECIALTY PHARMACY (OUTPATIENT)
Dept: PHARMACY | Facility: CLINIC | Age: 74
End: 2024-11-14

## 2024-12-05 ENCOUNTER — HOSPITAL ENCOUNTER (OUTPATIENT)
Dept: RADIOLOGY | Facility: CLINIC | Age: 74
Discharge: HOME | End: 2024-12-05
Payer: MEDICARE

## 2024-12-05 ENCOUNTER — SPECIALTY PHARMACY (OUTPATIENT)
Dept: PHARMACY | Facility: CLINIC | Age: 74
End: 2024-12-05

## 2024-12-05 VITALS — WEIGHT: 169.97 LBS | BODY MASS INDEX: 28.32 KG/M2 | HEIGHT: 65 IN

## 2024-12-05 DIAGNOSIS — Z12.31 SCREENING MAMMOGRAM FOR BREAST CANCER: ICD-10-CM

## 2024-12-05 PROCEDURE — 77067 SCR MAMMO BI INCL CAD: CPT

## 2024-12-20 ENCOUNTER — SPECIALTY PHARMACY (OUTPATIENT)
Dept: PHARMACY | Facility: CLINIC | Age: 74
End: 2024-12-20

## 2024-12-20 PROCEDURE — RXMED WILLOW AMBULATORY MEDICATION CHARGE

## 2025-01-01 ENCOUNTER — OFFICE VISIT (OUTPATIENT)
Dept: URGENT CARE | Age: 75
End: 2025-01-01
Payer: MEDICARE

## 2025-01-01 VITALS
OXYGEN SATURATION: 96 % | HEART RATE: 74 BPM | BODY MASS INDEX: 27.32 KG/M2 | DIASTOLIC BLOOD PRESSURE: 81 MMHG | HEIGHT: 66 IN | RESPIRATION RATE: 20 BRPM | TEMPERATURE: 97.9 F | WEIGHT: 170 LBS | SYSTOLIC BLOOD PRESSURE: 127 MMHG

## 2025-01-01 DIAGNOSIS — J01.10 ACUTE NON-RECURRENT FRONTAL SINUSITIS: Primary | ICD-10-CM

## 2025-01-01 RX ORDER — AMOXICILLIN 500 MG/1
500 CAPSULE ORAL EVERY 12 HOURS SCHEDULED
Qty: 20 CAPSULE | Refills: 0 | Status: SHIPPED | OUTPATIENT
Start: 2025-01-01 | End: 2025-01-11

## 2025-01-01 ASSESSMENT — ENCOUNTER SYMPTOMS
HEADACHES: 1
SINUS PAIN: 1
MYALGIAS: 0
APPETITE CHANGE: 0
DIZZINESS: 0
ARTHRALGIAS: 0
FEVER: 0
FATIGUE: 1
LIGHT-HEADEDNESS: 0
SHORTNESS OF BREATH: 0
SINUS PRESSURE: 1
SORE THROAT: 0
NAUSEA: 0
COUGH: 1
TROUBLE SWALLOWING: 0
NECK PAIN: 0
ACTIVITY CHANGE: 1

## 2025-01-01 NOTE — PATIENT INSTRUCTIONS
take antibiotics until gone.  Take Over the counter medications for symptoms.  Increase fluids and rest.  Follow up with your primary in 3-5 days

## 2025-01-01 NOTE — PROGRESS NOTES
Subjective   Patient ID: Kelly Moreland is a 74 y.o. female. They present today with a chief complaint of Headache (Headache ear pain runny nose a little bit of a cough 6 days).    History of Present Illness  73 YO F C/O FRONTAL ACHY HEADACHE, BILATERAL EAR PRESSURE, COUGH AND RUNNY NOSE X 6 DAYS OTC MEDICATIONS NOT HELPING       Headache  Associated symptoms: congestion, cough, drainage, ear pain, fatigue and sinus pressure    Associated symptoms: no dizziness, no fever, no myalgias, no nausea, no neck pain and no sore throat        Past Medical History  Allergies as of 01/01/2025 - Reviewed 01/01/2025   Allergen Reaction Noted    Pollen extracts Other 09/12/2023       (Not in a hospital admission)       Past Medical History:   Diagnosis Date    History of methicillin resistant Staphylococcus aureus infection 09/25/2024    Osteoporosis     Personal history of Methicillin resistant Staphylococcus aureus infection     History of methicillin resistant Staphylococcus aureus infection       Past Surgical History:   Procedure Laterality Date    OTHER SURGICAL HISTORY  08/11/2020    Lipoma excision    OTHER SURGICAL HISTORY  08/11/2020    Tonsillectomy        reports that she has never smoked. She has never used smokeless tobacco. She reports current alcohol use of about 2.0 standard drinks of alcohol per week. She reports that she does not use drugs.    Review of Systems  Review of Systems   Constitutional:  Positive for activity change and fatigue. Negative for appetite change and fever.   HENT:  Positive for congestion, ear pain, postnasal drip, sinus pressure and sinus pain. Negative for sore throat and trouble swallowing.    Respiratory:  Positive for cough. Negative for shortness of breath.    Gastrointestinal:  Negative for nausea.   Musculoskeletal:  Negative for arthralgias, myalgias and neck pain.   Neurological:  Positive for headaches. Negative for dizziness and light-headedness.                             "      Objective    Vitals:    01/01/25 1250   BP: 127/81   BP Location: Left arm   Patient Position: Sitting   BP Cuff Size: Small adult   Pulse: 74   Resp: 20   Temp: 36.6 °C (97.9 °F)   TempSrc: Oral   SpO2: 96%   Weight: 77.1 kg (170 lb)   Height: 1.676 m (5' 6\")     Patient's last menstrual period was 01/01/2000 (approximate).    Physical Exam  Vitals and nursing note reviewed.   Constitutional:       Appearance: Normal appearance.   HENT:      Right Ear: Ear canal and external ear normal. A middle ear effusion is present.      Left Ear: Ear canal and external ear normal. A middle ear effusion is present.      Mouth/Throat:      Mouth: Mucous membranes are moist.   Eyes:      Conjunctiva/sclera: Conjunctivae normal.   Cardiovascular:      Rate and Rhythm: Normal rate and regular rhythm.      Heart sounds: Normal heart sounds.   Pulmonary:      Effort: Pulmonary effort is normal.      Breath sounds: Normal breath sounds.   Musculoskeletal:      Cervical back: Normal range of motion and neck supple.   Skin:     General: Skin is warm and dry.   Neurological:      Mental Status: She is alert and oriented to person, place, and time.         Procedures    Point of Care Test & Imaging Results from this visit  No results found for this visit on 01/01/25.   No results found.    Diagnostic study results (if any) were reviewed by Patrica Looney PA-C.    Assessment/Plan   Allergies, medications, history, and pertinent labs/EKGs/Imaging reviewed by Patrica Looney PA-C.     Medical Decision Making      Orders and Diagnoses  Diagnoses and all orders for this visit:  Acute non-recurrent frontal sinusitis  -     amoxicillin (Amoxil) 500 mg capsule; Take 1 capsule (500 mg) by mouth every 12 hours for 10 days.      Medical Admin Record      Patient disposition: Home    Electronically signed by Patrica Lonoey PA-C  1:05 PM      "

## 2025-01-02 ENCOUNTER — PHARMACY VISIT (OUTPATIENT)
Dept: PHARMACY | Facility: CLINIC | Age: 75
End: 2025-01-02
Payer: MEDICARE

## 2025-01-08 ENCOUNTER — TELEPHONE (OUTPATIENT)
Dept: RHEUMATOLOGY | Facility: CLINIC | Age: 75
End: 2025-01-08
Payer: MEDICARE

## 2025-01-08 DIAGNOSIS — M81.0 POST-MENOPAUSAL OSTEOPOROSIS: Primary | ICD-10-CM

## 2025-01-24 ENCOUNTER — APPOINTMENT (OUTPATIENT)
Dept: RHEUMATOLOGY | Facility: CLINIC | Age: 75
End: 2025-01-24
Payer: MEDICARE

## 2025-01-24 ENCOUNTER — LAB (OUTPATIENT)
Dept: LAB | Facility: LAB | Age: 75
End: 2025-01-24
Payer: MEDICARE

## 2025-01-24 VITALS — BODY MASS INDEX: 27.32 KG/M2 | WEIGHT: 170 LBS | HEIGHT: 66 IN

## 2025-01-24 DIAGNOSIS — M81.0 POST-MENOPAUSAL OSTEOPOROSIS: ICD-10-CM

## 2025-01-24 DIAGNOSIS — M81.0 POST-MENOPAUSAL OSTEOPOROSIS: Primary | ICD-10-CM

## 2025-01-24 LAB
ANION GAP SERPL CALC-SCNC: 12 MMOL/L (ref 10–20)
BUN SERPL-MCNC: 22 MG/DL (ref 6–23)
CALCIUM SERPL-MCNC: 9.4 MG/DL (ref 8.6–10.6)
CHLORIDE SERPL-SCNC: 107 MMOL/L (ref 98–107)
CO2 SERPL-SCNC: 27 MMOL/L (ref 21–32)
CREAT SERPL-MCNC: 0.64 MG/DL (ref 0.5–1.05)
EGFRCR SERPLBLD CKD-EPI 2021: >90 ML/MIN/1.73M*2
GLUCOSE SERPL-MCNC: 107 MG/DL (ref 74–99)
POTASSIUM SERPL-SCNC: 4.2 MMOL/L (ref 3.5–5.3)
SODIUM SERPL-SCNC: 142 MMOL/L (ref 136–145)

## 2025-01-24 PROCEDURE — 80048 BASIC METABOLIC PNL TOTAL CA: CPT

## 2025-01-24 NOTE — PROGRESS NOTES
"Subjective . Kelly Moreland is a 74 y.o. female who presents for Follow-up (Prolia inject in the right).    HPI.  74-year-old female with history of postmenopausal osteoporosis, anxiety and lichen sclerosis presented for follow-up.     She has no recent fall.  She takes multivitamin  Centrum Centrum and vitamin D 2000 units daily.    Past antiresorptive therapies:  1.  Alendronate.  10/2022-11/2024.  2.  Raloxifene.    Bone densitometry obtained on November 7, 2024 showed L1-L4 BMD of 0.837 g/cm² reflecting a T-score of -2.9, left total femur BMD of 0.740 g/cm² reflecting a T-score of -2.1 and left femoral neck BMD of 0.690 g/cm² reflecting a T-score of -2.5     Review of Systems   All other systems reviewed and are negative.    Objective     Height 1.676 m (5' 6\"), weight 77.1 kg (170 lb), last menstrual period 01/01/2000.    Assessment/Plan .     #1: Postmenopausal osteoporosis.  Failed alendronate.  -Prolia injection #1 given at right upper arm.  -Continue calcium and vitamin D.  -Continue weightbearing exercises.      Follow-up in 6 months.     This note was partially generated using the Dragon Voice recognition system. There may be some incorrect wording, spelling and/or spelling errors or punctuation errors that were not corrected prior to committing the note to the medical record.    Problem List Items Addressed This Visit       Post-menopausal osteoporosis    Relevant Medications    denosumab (Prolia) injection 60 mg (Start on 1/25/2025 12:00 AM)            Active Ambulatory Problems     Diagnosis Date Noted    Abnormal mammogram 09/12/2023    Anxiety 09/12/2023    Atrophy of vagina 09/12/2023    Vaginal itching 09/12/2023    Vaginitis 09/12/2023    Chronic cough 09/12/2023    Finger laceration 09/12/2023    Ganglion cyst of wrist, right 09/12/2023    High triglycerides 09/12/2023    Lichen sclerosus 09/12/2023    Nasal mucosa dry 01/18/2016    Nasal turbinate hypertrophy 09/12/2023    Osteopenia 09/12/2023 "    Paronychia, finger 09/12/2023    Sensation of foreign body 09/12/2023    Perioral dermatitis 01/18/2016    Sinusitis 09/12/2023    Stye 09/12/2023    Bilateral tinnitus 09/12/2023    Tinnitus 09/12/2023    Urinary frequency 09/12/2023    Post-menopausal osteoporosis 09/25/2024     Resolved Ambulatory Problems     Diagnosis Date Noted    History of methicillin resistant Staphylococcus aureus infection 09/25/2024     Past Medical History:   Diagnosis Date    Osteoporosis     Personal history of Methicillin resistant Staphylococcus aureus infection        Family History   Problem Relation Name Age of Onset    Breast cancer Mother's Sister 60        Past Surgical History:   Procedure Laterality Date    OTHER SURGICAL HISTORY  08/11/2020    Lipoma excision    OTHER SURGICAL HISTORY  08/11/2020    Tonsillectomy       Social History     Tobacco Use   Smoking Status Never   Smokeless Tobacco Never       Allergies  Pollen extracts    Current Meds  Current Outpatient Medications   Medication Instructions    cholecalciferol (Vitamin D3) 50 mcg (2,000 unit) capsule Take by mouth.    citalopram (CELEXA) 20 mg, oral, Daily    denosumab (Prolia) 60 mg/mL syringe Office to inject 1 mL (60 mg total) under the skin every 6 months.    dorzolamide-timoloL (Cosopt) 22.3-6.8 mg/mL ophthalmic solution 1 drop, ophthalmic (eye), 2 times daily    multivitamin with minerals iron-free (Centrum Silver) Take by mouth.           Hoang Garza MD

## 2025-01-24 NOTE — PATIENT INSTRUCTIONS
Continue calcium and vitamin D.  Continue weightbearing exercises.  Follow fall precautions.  Follow-up in 6 months.

## 2025-06-10 ENCOUNTER — SPECIALTY PHARMACY (OUTPATIENT)
Dept: PHARMACY | Facility: CLINIC | Age: 75
End: 2025-06-10

## 2025-07-09 ENCOUNTER — SPECIALTY PHARMACY (OUTPATIENT)
Dept: PHARMACY | Facility: CLINIC | Age: 75
End: 2025-07-09

## 2025-10-15 ENCOUNTER — APPOINTMENT (OUTPATIENT)
Dept: PRIMARY CARE | Facility: CLINIC | Age: 75
End: 2025-10-15
Payer: MEDICARE